# Patient Record
Sex: MALE | Race: WHITE | NOT HISPANIC OR LATINO | Employment: OTHER | URBAN - METROPOLITAN AREA
[De-identification: names, ages, dates, MRNs, and addresses within clinical notes are randomized per-mention and may not be internally consistent; named-entity substitution may affect disease eponyms.]

---

## 2024-09-22 ENCOUNTER — APPOINTMENT (OUTPATIENT)
Dept: ULTRASOUND IMAGING | Facility: CLINIC | Age: 69
End: 2024-09-22
Attending: EMERGENCY MEDICINE
Payer: COMMERCIAL

## 2024-09-22 ENCOUNTER — APPOINTMENT (OUTPATIENT)
Dept: GENERAL RADIOLOGY | Facility: CLINIC | Age: 69
End: 2024-09-22
Attending: EMERGENCY MEDICINE
Payer: COMMERCIAL

## 2024-09-22 ENCOUNTER — HOSPITAL ENCOUNTER (EMERGENCY)
Facility: CLINIC | Age: 69
Discharge: HOME | End: 2024-09-22
Attending: EMERGENCY MEDICINE | Admitting: EMERGENCY MEDICINE
Payer: COMMERCIAL

## 2024-09-22 VITALS
TEMPERATURE: 97.8 F | HEIGHT: 70 IN | OXYGEN SATURATION: 98 % | WEIGHT: 210 LBS | BODY MASS INDEX: 30.06 KG/M2 | SYSTOLIC BLOOD PRESSURE: 117 MMHG | RESPIRATION RATE: 20 BRPM | HEART RATE: 62 BPM | DIASTOLIC BLOOD PRESSURE: 64 MMHG

## 2024-09-22 DIAGNOSIS — S76.302A LEFT HAMSTRING INJURY, INITIAL ENCOUNTER: Primary | ICD-10-CM

## 2024-09-22 PROCEDURE — 99284 EMERGENCY DEPT VISIT MOD MDM: CPT | Mod: 25

## 2024-09-22 PROCEDURE — 73552 X-RAY EXAM OF FEMUR 2/>: CPT | Mod: LT

## 2024-09-22 PROCEDURE — 250N000013 HC RX MED GY IP 250 OP 250 PS 637: Performed by: EMERGENCY MEDICINE

## 2024-09-22 PROCEDURE — 93971 EXTREMITY STUDY: CPT | Mod: LT

## 2024-09-22 RX ORDER — ACETAMINOPHEN 325 MG/1
975 TABLET ORAL ONCE
Status: COMPLETED | OUTPATIENT
Start: 2024-09-22 | End: 2024-09-22

## 2024-09-22 RX ORDER — LIDOCAINE 4 G/G
1 PATCH TOPICAL EVERY 24 HOURS
Qty: 20 PATCH | Refills: 0 | Status: SHIPPED | OUTPATIENT
Start: 2024-09-22

## 2024-09-22 RX ORDER — LIDOCAINE 4 G/G
1 PATCH TOPICAL ONCE
Status: DISCONTINUED | OUTPATIENT
Start: 2024-09-22 | End: 2024-09-22 | Stop reason: HOSPADM

## 2024-09-22 RX ADMIN — ACETAMINOPHEN 975 MG: 325 TABLET ORAL at 17:45

## 2024-09-22 RX ADMIN — LIDOCAINE 1 PATCH: 4 PATCH TOPICAL at 19:36

## 2024-09-22 ASSESSMENT — ACTIVITIES OF DAILY LIVING (ADL)
ADLS_ACUITY_SCORE: 33
ADLS_ACUITY_SCORE: 35

## 2024-09-22 ASSESSMENT — COLUMBIA-SUICIDE SEVERITY RATING SCALE - C-SSRS
6. HAVE YOU EVER DONE ANYTHING, STARTED TO DO ANYTHING, OR PREPARED TO DO ANYTHING TO END YOUR LIFE?: NO
2. HAVE YOU ACTUALLY HAD ANY THOUGHTS OF KILLING YOURSELF IN THE PAST MONTH?: NO
1. IN THE PAST MONTH, HAVE YOU WISHED YOU WERE DEAD OR WISHED YOU COULD GO TO SLEEP AND NOT WAKE UP?: NO

## 2024-09-22 NOTE — DISCHARGE INSTRUCTIONS
Please follow-up with your primary care provider and/or specialist regarding your visit to the ER today.    Please return to the emergency department should you experience any of the symptoms we specifically discussed, including but not limited to recurrence or worsening of your symptoms, or development of any new and concerning symptoms such as numbness, weakness, or color change in the left lower extremity.    Please have protected weightbearing with crutches to the left lower extremity.  Tylenol/ibuprofen/ice/lidocaine patches as needed for pain.    Please follow-up with [facility] orthopedics regarding your visit to the ER today.

## 2024-09-22 NOTE — ED TRIAGE NOTES
Pt was on walk with dog and tripped on path. Abrasions to right arm and hand. Feels like pulled something in back of left leg. Unable to bear weight on leg. Denies hitting head. No thinners.      Triage Assessment (Adult)       Row Name 09/22/24 1549          Triage Assessment    Airway WDL WDL        Respiratory WDL    Respiratory WDL WDL        Skin Circulation/Temperature WDL    Skin Circulation/Temperature WDL --  abrasions to right arm        Cardiac WDL    Cardiac WDL WDL        Peripheral/Neurovascular WDL    Peripheral Neurovascular WDL WDL        Cognitive/Neuro/Behavioral WDL    Cognitive/Neuro/Behavioral WDL WDL

## 2024-09-22 NOTE — ED PROVIDER NOTES
"  Emergency Department Note      History of Present Illness     Chief Complaint   Leg Injury and Fall      HPI   Sukhi Loomis is a 69 year old male presents to the emergency department for left posterior thigh pain after fall.  Patient reports that he tripped over a rock while walking h since the fall, patient has been unable to bear weight to his left lower extremity due to severe posterior thigh pain.  Patient intermittently having cramping type pain sensation in the left posterior thigh and feeling like he is having difficulty fully straightening his leg.  Denies any hip, knee, or ankle pain. Patient is uncertain whether or not he landed on that leg, but did note some dirt over that area, so he assumed he did.  No associated numbness or weakness.  Patient is up-to-date on tetanus vaccination.    Independent Historian   None    Review of External Notes   8/24/2023 office visit note    Past Medical History     Medical History and Problem List   No past medical history on file.    Medications   Lidocaine (LIDOCARE) 4 % Patch        Surgical History   No past surgical history on file.    Physical Exam     Patient Vitals for the past 24 hrs:   BP Temp Temp src Pulse Resp SpO2 Height Weight   09/22/24 1550 (!) 146/72 -- -- -- -- -- -- --   09/22/24 1548 -- 97.8  F (36.6  C) Temporal 61 16 99 % 1.778 m (5' 10\") 95.3 kg (210 lb)     Physical Exam  Constitutional:       Appearance: Normal appearance.      General: Not in acute distress.  HENT:      Head: Normocephalic and atraumatic.   Eyes:      Extraocular Movements: Extraocular movements intact.      Conjunctiva/sclera: Conjunctivae normal.   Cardiovascular:      Rate and Rhythm: Normal rate and regular rhythm.   Pulmonary:      Effort: Pulmonary effort is normal. No respiratory distress.   Abdominal:      General: Abdomen is flat. There is no distension.   Musculoskeletal:         General: No swelling or deformity.  Abrasions observed to the right upper " extremity and right knee.  No laceration requiring repair.     Cervical back: Normal range of motion. No rigidity.      Left lower extremity: Left knee held in slight flexion.  Tenderness to palpation to the upper lateral posterior thigh.  Unable to flex at knee against gravity (in a prone position) due to pain.  2+ dorsalis pedis pulse.  Normal range of motion of ankle.  Sensation intact.  No obvious ecchymosis to the posterior thigh.  Skin:     Coloration: Skin is not jaundiced or pale.   Neurological:      General: No focal deficit present.      Mental Status: Alert and oriented to person, place, and time.   Psychiatric:         Mood and Affect: Mood normal.         Behavior: Behavior normal.    Diagnostics     Lab Results   Labs Ordered and Resulted from Time of ED Arrival to Time of ED Departure - No data to display    Imaging   XR Femur Left 2 Views   Final Result   IMPRESSION: No evidence of an acute displaced left femoral fracture. No abnormal soft tissue calcification. No suspicious lytic or blastic lesions. There is no knee joint effusion.      US Lower Extremity Venous Duplex Left    (Results Pending)       EKG   None    Independent Interpretation   On my independent interpretation of left femur x-ray, there is no obvious fracture or dislocation.    ED Course      Medications Administered   Medications   Lidocaine (LIDOCARE) 4 % Patch 1 patch (has no administration in time range)   acetaminophen (TYLENOL) tablet 975 mg (975 mg Oral $Given 9/22/24 1439)       Procedures   Procedures     Discussion of Management   None    ED Course   ED Course as of 09/22/24 1855   Sun Sep 22, 2024   1741 Patient updated       Additional Documentation  None    Medical Decision Making / Diagnosis     CMS Diagnoses: None    MIPS       None    [facility]   Sukhi Loomis is a 69 year old male as described above presents to the emergency department with left posterior thigh pain after fall with difficulty with bearing weight due  to pain.  Patient hemodynamically stable at time of evaluation.  Afebrile.  On exam, patient appears to have some spasm to the left-sided hamstrings in addition to difficulty with flexing left knee against gravity while laying prone.  While no obvious overt bruising to the posterior thigh, suspicion is that patient has a hamstring tear injury.  Will obtain plain film imaging of the left femur for evaluation for acute fractures in addition to the left lower extremity ultrasound for evaluation for underlying hematoma.  Ultimately, if workup negative, will prescribe crutches and advised patient to protected weight-bear until follow-up with orthopedic surgery.  Continue rest, ice, and elevation in addition to trial of topical lidocaine patch.  Discussed care plan with patient and family who voiced understanding and agreement with plan.  Answered all questions.  Additional workup and orders as listed in chart.    Please refer to ED course above as part of continuation of MDM for details on the patient's treatment course and any potential changes or updates beyond my initial evaluation and MDM creation.    Disposition   Care of the patient was transferred to my colleague Dr. Dennis pending lower extremity ultrasound.     Diagnosis     ICD-10-CM    1. Left hamstring injury, initial encounter  S76.302A Crutches Order           Discharge Medications   New Prescriptions    LIDOCAINE (LIDOCARE) 4 % PATCH    Place 1 patch onto the skin every 24 hours. To prevent lidocaine toxicity, patient should be patch free for 12 hrs daily.         DO Melissa THOMAS Ferris, DO  09/22/24 7623